# Patient Record
Sex: FEMALE | Race: WHITE | NOT HISPANIC OR LATINO
[De-identification: names, ages, dates, MRNs, and addresses within clinical notes are randomized per-mention and may not be internally consistent; named-entity substitution may affect disease eponyms.]

---

## 2018-07-25 ENCOUNTER — APPOINTMENT (OUTPATIENT)
Dept: PULMONOLOGY | Facility: CLINIC | Age: 48
End: 2018-07-25
Payer: COMMERCIAL

## 2018-07-25 VITALS
BODY MASS INDEX: 31.36 KG/M2 | SYSTOLIC BLOOD PRESSURE: 110 MMHG | WEIGHT: 177 LBS | DIASTOLIC BLOOD PRESSURE: 66 MMHG | HEIGHT: 63 IN | HEART RATE: 72 BPM | OXYGEN SATURATION: 98 %

## 2018-07-25 DIAGNOSIS — R93.8 ABNORMAL FINDINGS ON DIAGNOSTIC IMAGING OF OTHER SPECIFIED BODY STRUCTURES: ICD-10-CM

## 2018-07-25 DIAGNOSIS — Z80.0 FAMILY HISTORY OF MALIGNANT NEOPLASM OF DIGESTIVE ORGANS: ICD-10-CM

## 2018-07-25 DIAGNOSIS — Z78.0 ASYMPTOMATIC MENOPAUSAL STATE: ICD-10-CM

## 2018-07-25 DIAGNOSIS — Z80.1 FAMILY HISTORY OF MALIGNANT NEOPLASM OF TRACHEA, BRONCHUS AND LUNG: ICD-10-CM

## 2018-07-25 PROCEDURE — 94729 DIFFUSING CAPACITY: CPT

## 2018-07-25 PROCEDURE — 94727 GAS DIL/WSHOT DETER LNG VOL: CPT

## 2018-07-25 PROCEDURE — 94010 BREATHING CAPACITY TEST: CPT

## 2018-07-25 PROCEDURE — 85018 HEMOGLOBIN: CPT | Mod: QW

## 2018-07-25 PROCEDURE — 99204 OFFICE O/P NEW MOD 45 MIN: CPT | Mod: 25

## 2018-07-25 RX ORDER — ESCITALOPRAM OXALATE 5 MG/1
5 TABLET, FILM COATED ORAL
Refills: 0 | Status: ACTIVE | COMMUNITY

## 2018-10-11 ENCOUNTER — FORM ENCOUNTER (OUTPATIENT)
Age: 48
End: 2018-10-11

## 2018-10-12 ENCOUNTER — OUTPATIENT (OUTPATIENT)
Dept: OUTPATIENT SERVICES | Facility: HOSPITAL | Age: 48
LOS: 1 days | End: 2018-10-12
Payer: COMMERCIAL

## 2018-10-12 ENCOUNTER — APPOINTMENT (OUTPATIENT)
Dept: RADIOLOGY | Facility: CLINIC | Age: 48
End: 2018-10-12

## 2018-10-12 DIAGNOSIS — R93.89 ABNORMAL FINDINGS ON DIAGNOSTIC IMAGING OF OTHER SPECIFIED BODY STRUCTURES: ICD-10-CM

## 2018-10-12 PROCEDURE — 71046 X-RAY EXAM CHEST 2 VIEWS: CPT | Mod: 26

## 2018-10-12 PROCEDURE — 71046 X-RAY EXAM CHEST 2 VIEWS: CPT

## 2018-10-24 ENCOUNTER — APPOINTMENT (OUTPATIENT)
Dept: PULMONOLOGY | Facility: CLINIC | Age: 48
End: 2018-10-24
Payer: COMMERCIAL

## 2018-10-24 VITALS
HEART RATE: 76 BPM | SYSTOLIC BLOOD PRESSURE: 130 MMHG | HEIGHT: 63 IN | BODY MASS INDEX: 32.6 KG/M2 | OXYGEN SATURATION: 98 % | WEIGHT: 184 LBS | DIASTOLIC BLOOD PRESSURE: 70 MMHG

## 2018-10-24 PROCEDURE — 99214 OFFICE O/P EST MOD 30 MIN: CPT

## 2019-07-30 ENCOUNTER — APPOINTMENT (OUTPATIENT)
Dept: PULMONOLOGY | Facility: CLINIC | Age: 49
End: 2019-07-30
Payer: COMMERCIAL

## 2019-07-30 VITALS
BODY MASS INDEX: 31.71 KG/M2 | DIASTOLIC BLOOD PRESSURE: 62 MMHG | OXYGEN SATURATION: 98 % | WEIGHT: 179 LBS | HEART RATE: 87 BPM | SYSTOLIC BLOOD PRESSURE: 112 MMHG

## 2019-07-30 PROCEDURE — 99214 OFFICE O/P EST MOD 30 MIN: CPT

## 2019-07-30 RX ORDER — ESTRADIOL 0.05 MG/D
0.05 FILM, EXTENDED RELEASE TRANSDERMAL
Refills: 0 | Status: DISCONTINUED | COMMUNITY
End: 2019-07-30

## 2019-07-30 RX ORDER — PROGESTERONE 200 MG/1
CAPSULE ORAL
Refills: 0 | Status: DISCONTINUED | COMMUNITY
End: 2019-07-30

## 2020-06-01 ENCOUNTER — APPOINTMENT (OUTPATIENT)
Dept: PULMONOLOGY | Facility: CLINIC | Age: 50
End: 2020-06-01
Payer: COMMERCIAL

## 2020-06-01 VITALS
HEART RATE: 84 BPM | SYSTOLIC BLOOD PRESSURE: 130 MMHG | HEIGHT: 63 IN | BODY MASS INDEX: 35.08 KG/M2 | DIASTOLIC BLOOD PRESSURE: 70 MMHG | OXYGEN SATURATION: 98 % | WEIGHT: 198 LBS

## 2020-06-01 DIAGNOSIS — R06.02 SHORTNESS OF BREATH: ICD-10-CM

## 2020-06-01 DIAGNOSIS — G47.33 OBSTRUCTIVE SLEEP APNEA (ADULT) (PEDIATRIC): ICD-10-CM

## 2020-06-01 DIAGNOSIS — J45.20 MILD INTERMITTENT ASTHMA, UNCOMPLICATED: ICD-10-CM

## 2020-06-01 DIAGNOSIS — E66.9 OBESITY, UNSPECIFIED: ICD-10-CM

## 2020-06-01 DIAGNOSIS — G47.10 HYPERSOMNIA, UNSPECIFIED: ICD-10-CM

## 2020-06-01 PROCEDURE — 99214 OFFICE O/P EST MOD 30 MIN: CPT

## 2020-06-01 RX ORDER — FENOPROFEN CALCIUM 200 MG/1
200 CAPSULE ORAL
Refills: 0 | Status: ACTIVE | COMMUNITY

## 2020-06-01 RX ORDER — CHLORZOXAZONE 250 MG/1
250 TABLET ORAL
Refills: 0 | Status: ACTIVE | COMMUNITY

## 2020-06-01 RX ORDER — ALBUTEROL SULFATE 90 UG/1
108 (90 BASE) INHALANT RESPIRATORY (INHALATION)
Qty: 1 | Refills: 5 | Status: ACTIVE | COMMUNITY
Start: 2019-07-30 | End: 1900-01-01

## 2020-07-24 ENCOUNTER — TRANSCRIPTION ENCOUNTER (OUTPATIENT)
Age: 50
End: 2020-07-24

## 2020-12-08 ENCOUNTER — APPOINTMENT (OUTPATIENT)
Dept: PULMONOLOGY | Facility: CLINIC | Age: 50
End: 2020-12-08

## 2023-02-20 ENCOUNTER — NON-APPOINTMENT (OUTPATIENT)
Age: 53
End: 2023-02-20

## 2023-03-20 ENCOUNTER — APPOINTMENT (OUTPATIENT)
Dept: GASTROENTEROLOGY | Facility: CLINIC | Age: 53
End: 2023-03-20

## 2023-04-05 ENCOUNTER — APPOINTMENT (OUTPATIENT)
Dept: ENDOCRINOLOGY | Facility: CLINIC | Age: 53
End: 2023-04-05

## 2024-07-01 ENCOUNTER — APPOINTMENT (OUTPATIENT)
Dept: ORTHOPEDIC SURGERY | Facility: CLINIC | Age: 54
End: 2024-07-01

## 2024-07-10 ENCOUNTER — APPOINTMENT (OUTPATIENT)
Dept: ORTHOPEDIC SURGERY | Facility: CLINIC | Age: 54
End: 2024-07-10

## 2024-07-16 ENCOUNTER — APPOINTMENT (OUTPATIENT)
Dept: ORTHOPEDIC SURGERY | Facility: CLINIC | Age: 54
End: 2024-07-16

## 2024-07-16 VITALS
BODY MASS INDEX: 29.95 KG/M2 | DIASTOLIC BLOOD PRESSURE: 77 MMHG | SYSTOLIC BLOOD PRESSURE: 117 MMHG | HEIGHT: 63 IN | TEMPERATURE: 97.9 F | WEIGHT: 169 LBS | HEART RATE: 65 BPM

## 2024-07-16 DIAGNOSIS — M79.642 PAIN IN RIGHT HAND: ICD-10-CM

## 2024-07-16 DIAGNOSIS — M79.641 PAIN IN RIGHT HAND: ICD-10-CM

## 2024-07-16 DIAGNOSIS — M65.9 SYNOVITIS AND TENOSYNOVITIS, UNSPECIFIED: ICD-10-CM

## 2024-07-16 DIAGNOSIS — M25.531 PAIN IN RIGHT WRIST: ICD-10-CM

## 2024-07-16 DIAGNOSIS — S69.80XD OTHER SPECIFIED INJURIES OF UNSPECIFIED WRIST, HAND AND FINGER(S), SUBSEQUENT ENCOUNTER: ICD-10-CM

## 2024-07-16 DIAGNOSIS — M25.532 PAIN IN RIGHT WRIST: ICD-10-CM

## 2024-07-16 DIAGNOSIS — G56.00 CARPAL TUNNEL SYNDROME, UNSPECIFIED UPPER LIMB: ICD-10-CM

## 2024-07-16 PROCEDURE — 20526 THER INJECTION CARP TUNNEL: CPT | Mod: 50,59

## 2024-07-16 PROCEDURE — 99204 OFFICE O/P NEW MOD 45 MIN: CPT | Mod: 25

## 2024-07-16 PROCEDURE — 20605 DRAIN/INJ JOINT/BURSA W/O US: CPT | Mod: RT

## 2024-07-16 PROCEDURE — 73130 X-RAY EXAM OF HAND: CPT | Mod: 50

## 2024-07-16 PROCEDURE — 20550 NJX 1 TENDON SHEATH/LIGAMENT: CPT | Mod: 59,RT

## 2024-07-22 ENCOUNTER — NON-APPOINTMENT (OUTPATIENT)
Age: 54
End: 2024-07-22

## 2024-07-22 ENCOUNTER — APPOINTMENT (OUTPATIENT)
Dept: FAMILY MEDICINE | Facility: CLINIC | Age: 54
End: 2024-07-22
Payer: COMMERCIAL

## 2024-07-22 VITALS
SYSTOLIC BLOOD PRESSURE: 118 MMHG | BODY MASS INDEX: 30.3 KG/M2 | HEART RATE: 66 BPM | DIASTOLIC BLOOD PRESSURE: 78 MMHG | WEIGHT: 171 LBS | RESPIRATION RATE: 14 BRPM | OXYGEN SATURATION: 97 % | HEIGHT: 63 IN

## 2024-07-22 DIAGNOSIS — F41.9 ANXIETY DISORDER, UNSPECIFIED: ICD-10-CM

## 2024-07-22 DIAGNOSIS — Z76.89 PERSONS ENCOUNTERING HEALTH SERVICES IN OTHER SPECIFIED CIRCUMSTANCES: ICD-10-CM

## 2024-07-22 DIAGNOSIS — G47.10 HYPERSOMNIA, UNSPECIFIED: ICD-10-CM

## 2024-07-22 DIAGNOSIS — J45.20 MILD INTERMITTENT ASTHMA, UNCOMPLICATED: ICD-10-CM

## 2024-07-22 DIAGNOSIS — K75.81 NONALCOHOLIC STEATOHEPATITIS (NASH): ICD-10-CM

## 2024-07-22 DIAGNOSIS — F32.A ANXIETY DISORDER, UNSPECIFIED: ICD-10-CM

## 2024-07-22 DIAGNOSIS — E78.5 HYPERLIPIDEMIA, UNSPECIFIED: ICD-10-CM

## 2024-07-22 DIAGNOSIS — R93.89 ABNORMAL FINDINGS ON DIAGNOSTIC IMAGING OF OTHER SPECIFIED BODY STRUCTURES: ICD-10-CM

## 2024-07-22 DIAGNOSIS — Z87.898 PERSONAL HISTORY OF OTHER SPECIFIED CONDITIONS: ICD-10-CM

## 2024-07-22 DIAGNOSIS — E66.9 OBESITY, UNSPECIFIED: ICD-10-CM

## 2024-07-22 PROCEDURE — G2211 COMPLEX E/M VISIT ADD ON: CPT

## 2024-07-22 PROCEDURE — 99203 OFFICE O/P NEW LOW 30 MIN: CPT

## 2024-07-22 RX ORDER — PRAVASTATIN SODIUM 20 MG/1
20 TABLET ORAL
Qty: 90 | Refills: 0 | Status: ACTIVE | COMMUNITY
Start: 2024-07-22 | End: 1900-01-01

## 2024-07-22 NOTE — HEALTH RISK ASSESSMENT
[No] : In the past 12 months have you used drugs other than those required for medical reasons? No [No falls in past year] : Patient reported no falls in the past year [0] : 2) Feeling down, depressed, or hopeless: Not at all (0) [PHQ-2 Negative - No further assessment needed] : PHQ-2 Negative - No further assessment needed [Patient reported mammogram was normal] : Patient reported mammogram was normal [Patient reported PAP Smear was normal] : Patient reported PAP Smear was normal [Patient reported colonoscopy was normal] : Patient reported colonoscopy was normal [Never] : Never [Excellent] : ~his/her~  mood as  excellent [With Significant Other] : lives with significant other [Employed] : employed [] :  [FreeTextEntry1] : refer to HPI  [de-identified] : prior GI, ortho, GYN  [[id number]] : 0 [Change in mental status noted] : No change in mental status noted [ColonoscopyDate] : 2022

## 2024-07-22 NOTE — HISTORY OF PRESENT ILLNESS
[FreeTextEntry1] : Patient presents to establish care. She has bloodwork results from June with her. [de-identified] : 54F is here new patient. PMHX asthma, GONZALES. Admits to diffuse joint pain planned to see rheumatology.

## 2024-07-22 NOTE — PLAN
[FreeTextEntry1] : 54F NPA  records requested  reviewed prior labs with elevated cholesterol  will start pravastatin 20 mg PO QD  requested abd sonogram  refer to GI  pt up to date with pap, mammogram and colonoscopy  PHQ-9 negative

## 2024-09-19 ENCOUNTER — APPOINTMENT (OUTPATIENT)
Dept: ORTHOPEDIC SURGERY | Facility: CLINIC | Age: 54
End: 2024-09-19

## 2024-09-23 ENCOUNTER — APPOINTMENT (OUTPATIENT)
Dept: ORTHOPEDIC SURGERY | Facility: CLINIC | Age: 54
End: 2024-09-23
Payer: COMMERCIAL

## 2024-09-23 VITALS — WEIGHT: 169 LBS | BODY MASS INDEX: 29.95 KG/M2 | HEIGHT: 63 IN

## 2024-09-23 DIAGNOSIS — M25.531 PAIN IN RIGHT WRIST: ICD-10-CM

## 2024-09-23 DIAGNOSIS — M77.8 OTHER ENTHESOPATHIES, NOT ELSEWHERE CLASSIFIED: ICD-10-CM

## 2024-09-23 PROCEDURE — 73110 X-RAY EXAM OF WRIST: CPT | Mod: RT

## 2024-09-24 NOTE — HISTORY OF PRESENT ILLNESS
[de-identified] : Age: 54 year F PMHx: Hand Dominance: RHD Chief Complaint: Patient c/o right wrist pain x March 2024 and left thumb pain x 2020. Patient reports NKI. Patient reports she saw Dr. Philip on 07/16/24 for her right wrist pain and had negative XR in the office. Patient reports on 07/16/24 she had CSI bilateral carpal tunnel, right wrist joint, right ECU subs sheath with great relief in her pain. Patient reports Dr. Philip provided a brace that gives her minimal relief. Patient reports Dr. Philip prescribed meloxicam; reports she only took a few doses. Patient reports she feels pain in her right wrist when she writes and  objects. Patient reports she had an MRI of the left thumb in 2020 at . Patient reports she had seen Dr. Cortes for her left thumb with [facility], was dx with trigger finger of the thumb, and attended OT for 3-4 weeks in 2020. Patient reports she has not been treated since for her left thumb.  Trauma: NKI Outside Imaging/Treatment: XR of bilateral hands and wrists with Dr. Philip (negative) OTC Medications: None (was prescribed meloxicam by Dr. Philip and only took a few doses).  OT/PT: OT for left thumb for about 3-4 weeks in 2020 Bracing: Yes for right wrist Pain worse with: Movement, gripping objects Pain better with: CSI, brace, and meloxicam

## 2024-09-24 NOTE — HISTORY OF PRESENT ILLNESS
[de-identified] : Age: 54 year F PMHx: Hand Dominance: RHD Chief Complaint: Patient c/o right wrist pain x March 2024 and left thumb pain x 2020. Patient reports NKI. Patient reports she saw Dr. Philip on 07/16/24 for her right wrist pain and had negative XR in the office. Patient reports on 07/16/24 she had CSI bilateral carpal tunnel, right wrist joint, right ECU subs sheath with great relief in her pain. Patient reports Dr. Philip provided a brace that gives her minimal relief. Patient reports Dr. Philip prescribed meloxicam; reports she only took a few doses. Patient reports she feels pain in her right wrist when she writes and  objects. Patient reports she had an MRI of the left thumb in 2020 at . Patient reports she had seen Dr. Cortes for her left thumb with [facility], was dx with trigger finger of the thumb, and attended OT for 3-4 weeks in 2020. Patient reports she has not been treated since for her left thumb.  Trauma: NKI Outside Imaging/Treatment: XR of bilateral hands and wrists with Dr. Philip (negative) OTC Medications: None (was prescribed meloxicam by Dr. Philip and only took a few doses).  OT/PT: OT for left thumb for about 3-4 weeks in 2020 Bracing: Yes for right wrist Pain worse with: Movement, gripping objects Pain better with: CSI, brace, and meloxicam

## 2024-09-24 NOTE — HISTORY OF PRESENT ILLNESS
[de-identified] : Age: 54 year F PMHx: Hand Dominance: RHD Chief Complaint: Patient c/o right wrist pain x March 2024 and left thumb pain x 2020. Patient reports NKI. Patient reports she saw Dr. Philip on 07/16/24 for her right wrist pain and had negative XR in the office. Patient reports on 07/16/24 she had CSI bilateral carpal tunnel, right wrist joint, right ECU subs sheath with great relief in her pain. Patient reports Dr. Philip provided a brace that gives her minimal relief. Patient reports Dr. Philip prescribed meloxicam; reports she only took a few doses. Patient reports she feels pain in her right wrist when she writes and  objects. Patient reports she had an MRI of the left thumb in 2020 at . Patient reports she had seen Dr. Cortes for her left thumb with [facility], was dx with trigger finger of the thumb, and attended OT for 3-4 weeks in 2020. Patient reports she has not been treated since for her left thumb.  Trauma: NKI Outside Imaging/Treatment: XR of bilateral hands and wrists with Dr. Philip (negative) OTC Medications: None (was prescribed meloxicam by Dr. Philip and only took a few doses).  OT/PT: OT for left thumb for about 3-4 weeks in 2020 Bracing: Yes for right wrist Pain worse with: Movement, gripping objects Pain better with: CSI, brace, and meloxicam

## 2024-09-30 DIAGNOSIS — M65.312 TRIGGER THUMB, LEFT THUMB: ICD-10-CM
